# Patient Record
Sex: MALE | Race: WHITE | Employment: OTHER | URBAN - METROPOLITAN AREA
[De-identification: names, ages, dates, MRNs, and addresses within clinical notes are randomized per-mention and may not be internally consistent; named-entity substitution may affect disease eponyms.]

---

## 2023-05-30 ENCOUNTER — TELEPHONE (OUTPATIENT)
Dept: ORTHOPEDIC SURGERY | Age: 73
End: 2023-05-30

## 2023-05-30 ENCOUNTER — OFFICE VISIT (OUTPATIENT)
Dept: ORTHOPEDIC SURGERY | Age: 73
End: 2023-05-30

## 2023-05-30 ENCOUNTER — EVALUATION (OUTPATIENT)
Age: 73
End: 2023-05-30
Payer: MEDICARE

## 2023-05-30 DIAGNOSIS — S52.572A OTHER INTRAARTICULAR FRACTURE OF LOWER END OF LEFT RADIUS, INITIAL ENCOUNTER FOR CLOSED FRACTURE: ICD-10-CM

## 2023-05-30 DIAGNOSIS — M25.522 LEFT ELBOW PAIN: ICD-10-CM

## 2023-05-30 DIAGNOSIS — M25.632 STIFFNESS OF LEFT WRIST JOINT: ICD-10-CM

## 2023-05-30 DIAGNOSIS — M25.642 STIFFNESS OF LEFT HAND JOINT: ICD-10-CM

## 2023-05-30 DIAGNOSIS — S52.572A OTHER CLOSED INTRA-ARTICULAR FRACTURE OF DISTAL END OF LEFT RADIUS, INITIAL ENCOUNTER: Primary | ICD-10-CM

## 2023-05-30 DIAGNOSIS — S52.572A OTHER INTRAARTICULAR FRACTURE OF LOWER END OF LEFT RADIUS, INITIAL ENCOUNTER FOR CLOSED FRACTURE: Primary | ICD-10-CM

## 2023-05-30 PROCEDURE — 97110 THERAPEUTIC EXERCISES: CPT | Performed by: OCCUPATIONAL THERAPIST

## 2023-05-30 PROCEDURE — 97165 OT EVAL LOW COMPLEX 30 MIN: CPT | Performed by: OCCUPATIONAL THERAPIST

## 2023-05-30 RX ORDER — TRAMADOL HYDROCHLORIDE 50 MG/1
50 TABLET ORAL EVERY 4 HOURS PRN
Qty: 15 TABLET | Refills: 0 | Status: SHIPPED | OUTPATIENT
Start: 2023-05-30 | End: 2023-06-04

## 2023-05-30 NOTE — PROGRESS NOTES
[facility] Note    Name: Gemma Martin  Age: 68 y.o. YOB: 1950  Gender: male  MRN: [id number]      CC: Patient referred for evaluation of hand pain    HPI: Gemma Martin is a 68 y.o. male right handed with a chief complaint of  left wrist pain. The injury occurred 4 weeks ago when the patient tripped over his barstool hit his elbow and left wrist, was referred to Dr. Augie Wang but has not he seen him, he saw his nurse practitioner and was not pleased with the treatment he received, he continues to complain of pain in the left wrist despite being placed in a wrist brace for the past 4 weeks. The pain is located diffusely about the wrist. Denies numbness or paresthesias of the fingers. Evaluation has included x-rays. Treatment to date has included splint. Denies loss of consciousness, head injury or neck pain. ROS/Meds/PSH/PMH/FH/SH: I personally reviewed the patients standard intake form. Pertinents are discussed in the HPI    Physical Examination:  General: Awake and alert. HEENT: Normocephalic, atraumatic  CV/Pulm: Breathing even and unlabored  Skin: No obvious rashes noted. Lymphatic: No obvious evidence of lymphedema or lymphadenopathy    Musculoskeletal Exam:  Examination of the left upper extremity demonstrates no open wounds. Negative Tinel's over left carpal tunnel. Sensation is intact throughout, cap refill in all fingers < 5 seconds. Finger motion is limited with moderate swelling of the hand and fingers. Tenderness over the distal radius. Positive tenderness over the ulnar aspect of the wrist. No tenderness at elbow, shoulder, clavicle or cervical spine.     Imaging / Electrodiagnostic Tests:     CT scan of the  wrist from outside source was reviewed and independently interpreted, this demonstrates a distal radius fracture with neutral tilt, articular extension however, there is no articular step-off, well reduced DRUJ, 5 mm of impaction    left Wrist XR: AP, Lateral,

## 2023-05-30 NOTE — PROGRESS NOTES
Patient was fitted and instructed on a Reparel Elbow Sleeve for the right foot. Patient read and signed documenting they understand and agree to [facility]'s current DME return policy.

## 2023-05-30 NOTE — TELEPHONE ENCOUNTER
Tramadol was sent in this morning but the pharmacy is calling because he's had different rx from different practices in [place] so they want to follow up on and make sure everything is right.

## 2023-05-30 NOTE — PROGRESS NOTES
[facility] [address]  [place] [address]  Dept: [phone]      Occupational Therapy Initial Assessment     Referring MD: Timo Weinstein MD    Diagnosis:     ICD-10-CM    1. Other closed intra-articular fracture of distal end of left radius, initial encounter  S52.572A       2. Stiffness of left wrist joint  M25.632       3. Stiffness of left hand joint  M25.642       4. Other intraarticular fracture of lower end of left radius, initial encounter for closed fracture [S52.572A (ICD-10-CM)]  X58.989V            Surgery/Medical Dx: NA     Therapy precautions: Fracture precautions    History of injury/onset : [address] when alarm went off early am, dark room and hit the entire left arm. Total Direct Treatment Time: 15 min  Total In Office Time: 40 min  Modifier needed: No  Episode visit count:  1     Preferred Name: Edel Moseley HISTORY     [address] [facility]: No past medical history on file., No past surgical history on file. Medications. : Reviewed in chart  Allergies: Not on File     SUBJECTIVE     Current Symptoms/Chief complaints:   Chief Complaint   Patient presents with    Wrist Pain         Chief complaint/history of injury:   Date symptoms began: about 4 weeks ago  Cynthia Bumpers of condition:Recent onset (initial onset within last 3 months)  Primary cause of current episode: Traumatic  How did symptoms start: [address]  Describe current symptoms: left wrist swelling, stiffness, hand stiffness and pain in the distal L UE    Received previous outpatient therapy? No      Pain Assessment:  Pain location: L wrist and hand  Average Pain/symptom intensity (0-10 scale)  Last 24 hours: 2-4/10  Last week (1-7 days): _4/10  How often do you feel symptoms?  Constant (% of time)  Description: aching  Aggravating factors: upper body dressing/grooming and driving  Alleviating factors: avoid aggravating movements    Social/Functional Hx:  Pt lives lives alone   Current DME: brace/splint:

## 2024-02-27 ENCOUNTER — HOME HEALTH ADMISSION (OUTPATIENT)
Dept: HOME HEALTH SERVICES | Facility: HOME HEALTH | Age: 74
End: 2024-02-27